# Patient Record
Sex: MALE | Race: WHITE | NOT HISPANIC OR LATINO | Employment: FULL TIME | URBAN - METROPOLITAN AREA
[De-identification: names, ages, dates, MRNs, and addresses within clinical notes are randomized per-mention and may not be internally consistent; named-entity substitution may affect disease eponyms.]

---

## 2018-05-31 ENCOUNTER — TRANSCRIBE ORDERS (OUTPATIENT)
Dept: ADMINISTRATIVE | Facility: HOSPITAL | Age: 53
End: 2018-05-31

## 2018-05-31 DIAGNOSIS — R20.0 BILATERAL HAND NUMBNESS: ICD-10-CM

## 2018-05-31 DIAGNOSIS — R20.0 NUMBNESS: Primary | ICD-10-CM

## 2018-06-13 ENCOUNTER — PROCEDURE VISIT (OUTPATIENT)
Dept: NEUROLOGY | Facility: CLINIC | Age: 53
End: 2018-06-13
Payer: COMMERCIAL

## 2018-06-13 DIAGNOSIS — R20.0 NUMBNESS: ICD-10-CM

## 2018-06-13 DIAGNOSIS — R20.0 BILATERAL HAND NUMBNESS: ICD-10-CM

## 2018-06-13 PROCEDURE — 95910 NRV CNDJ TEST 7-8 STUDIES: CPT | Performed by: PHYSICAL MEDICINE & REHABILITATION

## 2018-06-13 PROCEDURE — 95886 MUSC TEST DONE W/N TEST COMP: CPT | Performed by: PHYSICAL MEDICINE & REHABILITATION

## 2018-06-13 NOTE — PROGRESS NOTES
The procedure was discussed with the patient  Verbal consent was obtained after discussing risks and benefits  A sterile concentric needle electrode was used  The patient tolerated the procedure well  There were no complications  EMG REPORT    Test: Bilateral Upper Extremities   Performing Dr:  JOSE Giordano  The left median motor terminal latency was prolonged with normal compound motor action potential amplitude and a normal conduction velocity across the wrist   The right median motor terminal latency was prolonged with a normal compound motor action potential amplitude and a normal conduction velocity across the wrist   The bilateral ulnar motor conduction velocities and compound muscle action potentials were normal with normal distal latencies across the wrists  The left and right median sensory peak latency was prolonged with a low sensory action potential amplitude  The bilateral ulnar sensory conduction velocity and sensory action potentials were also normal with normal distal latencies across the wrists  The right median palmar evoked response was prolonged by 1 4 milliseconds as compared to the right ulnar palmar evoked response at the same distance  The left median palmar evoked response was prolonged by 2 0 milliseconds as compared to the left ulnar palmar evoked response at the same distance  The left and right ulnar and right median F wave latencies were within normal limits  The left median F-wave was prolonged  Concentric needle EMG of the upper extremities bilaterally and cervical paraspinal muscles revealed no spontaneous activities  Mild decreased recruitment was noted in the bilateral abductor pollicis brevis  Early recruited motor units appear normal with recruitment patterns being full or full for effort in the remaining muscles tested  INTERPRETATION: There is electrophysiologic evidence of a:    1    Bilateral moderate median nerve compression neuropathy at the wrist with demyelinative changes, consistent with a diagnosis of carpal tunnel syndrome  2   There is no evidence of a cervical radiculopathy or ulnar neuropathy bilaterally  Clinical correlation is recommended     JOSE Pedraza

## 2018-08-20 ENCOUNTER — TELEPHONE (OUTPATIENT)
Dept: NEUROLOGY | Facility: CLINIC | Age: 53
End: 2018-08-20

## 2018-08-20 NOTE — TELEPHONE ENCOUNTER
Dr Nazanin Chicas, patient called and asked for results of his EMG  The patient will see his Dr Ken Murphy morning at 9, and need results before then   Please let us know if you discussed his results with him, and if we can print them and give to patient  Please advised!!! Thank you

## 2021-06-22 ENCOUNTER — TELEPHONE (OUTPATIENT)
Dept: NEUROLOGY | Facility: CLINIC | Age: 56
End: 2021-06-22

## 2021-06-22 NOTE — TELEPHONE ENCOUNTER
Received a call from patient's PCP requesting a fax of the EMG from 2018  Faxed to [phone] as requested

## 2021-10-18 ENCOUNTER — TELEPHONE (OUTPATIENT)
Dept: NEUROLOGY | Facility: CLINIC | Age: 56
End: 2021-10-18

## 2021-10-20 ENCOUNTER — PROCEDURE VISIT (OUTPATIENT)
Dept: NEUROLOGY | Facility: CLINIC | Age: 56
End: 2021-10-20
Payer: OTHER MISCELLANEOUS

## 2021-10-20 DIAGNOSIS — G56.23 LESION OF ULNAR NERVE, BILATERAL UPPER LIMBS: ICD-10-CM

## 2021-10-20 PROCEDURE — 95911 NRV CNDJ TEST 9-10 STUDIES: CPT | Performed by: PHYSICAL MEDICINE & REHABILITATION

## 2021-10-20 PROCEDURE — 95886 MUSC TEST DONE W/N TEST COMP: CPT | Performed by: PHYSICAL MEDICINE & REHABILITATION

## 2023-05-01 RX ORDER — SILDENAFIL 100 MG/1
100 TABLET, FILM COATED ORAL
COMMUNITY
Start: 2019-06-18

## 2023-05-01 RX ORDER — ATORVASTATIN CALCIUM 20 MG/1
TABLET, FILM COATED ORAL
COMMUNITY
Start: 2021-06-04

## 2023-05-02 ENCOUNTER — PREP FOR PROCEDURE (OUTPATIENT)
Dept: GASTROENTEROLOGY | Facility: CLINIC | Age: 58
End: 2023-05-02

## 2023-05-02 ENCOUNTER — OFFICE VISIT (OUTPATIENT)
Dept: GASTROENTEROLOGY | Facility: CLINIC | Age: 58
End: 2023-05-02

## 2023-05-02 VITALS
WEIGHT: 260.4 LBS | HEART RATE: 80 BPM | HEIGHT: 68 IN | DIASTOLIC BLOOD PRESSURE: 84 MMHG | BODY MASS INDEX: 39.47 KG/M2 | SYSTOLIC BLOOD PRESSURE: 126 MMHG

## 2023-05-02 DIAGNOSIS — R12 HEARTBURN: Primary | ICD-10-CM

## 2023-05-02 DIAGNOSIS — K21.9 CHRONIC GERD: ICD-10-CM

## 2023-05-02 RX ORDER — PANTOPRAZOLE SODIUM 40 MG/1
40 TABLET, DELAYED RELEASE ORAL DAILY
Qty: 30 TABLET | Refills: 2 | Status: SHIPPED | OUTPATIENT
Start: 2023-05-02

## 2023-05-02 NOTE — PROGRESS NOTES
[facility] - Outpatient Consultation  [facility] 66 y o  male MRN: [id number]  Encounter: [id number]          ASSESSMENT AND PLAN:      1  Heartburn  2  Chronic GERD  - He has chronic GERD presenting with worsening heartburn recently and no improvement with OTC nexium  - We will schedule an EGD for further evaluation, screen for Love's  - Discussed anti-reflux diet, specifically cutting down on soda intake and weight loss  - Start protonix 40 mg daily      ______________________________________________________________________    HPI:  Adilia Whalen is a 67 yo M with a PMH of HLD, GERD, presenting for worsening and frequent heartburn recently  He reports having GERD symptoms for years and he lost weight previously which helped but then he gained weight and his symptoms worsened  He reports no improvement with OTC nexium  He has never had an EGD  He denies any associated nausea, vomiting, or dysphagia  He has no smoking history  He drinks 1-2 mixed drinks 2-3 times per week  He does drink soda daily as his source of caffeine in the morning, does not drink coffee  He is up to date with his colonoscopy with Dr Janet Joy  REVIEW OF SYSTEMS:    CONSTITUTIONAL: Denies any fever, chills, rigors, and weight loss  HEENT: No earache or tinnitus  Denies hearing loss or visual disturbances  CARDIOVASCULAR: No chest pain or palpitations  RESPIRATORY: Denies any cough, hemoptysis, shortness of breath or dyspnea on exertion  GASTROINTESTINAL: As noted in the History of Present Illness  GENITOURINARY: No problems with urination  Denies any hematuria or dysuria  NEUROLOGIC: No dizziness or vertigo, denies headaches  MUSCULOSKELETAL: Denies any muscle or joint pain  SKIN: Denies skin rashes or itching  ENDOCRINE: Denies excessive thirst  Denies intolerance to heat or cold  PSYCHOSOCIAL: Denies depression or anxiety  Denies any recent memory loss         Historical Information   History "reviewed  No pertinent past medical history  History reviewed  No pertinent surgical history  Social History   Social History     Substance and Sexual Activity   Alcohol Use Yes     Social History     Substance and Sexual Activity   Drug Use Never     Social History     Tobacco Use   Smoking Status Never   Smokeless Tobacco Never     Family History   Problem Relation Age of Onset    No Known Problems Mother     No Known Problems Father     No Known Problems Sister     No Known Problems Brother     No Known Problems Maternal Grandmother     No Known Problems Maternal Grandfather     No Known Problems Paternal Grandmother     No Known Problems Paternal Grandfather     No Known Problems Maternal Aunt     No Known Problems Maternal Uncle     No Known Problems Paternal Aunt     No Known Problems Paternal Uncle     No Known Problems Cousin        Meds/Allergies       Current Outpatient Medications:     atorvastatin (LIPITOR) 20 mg tablet    pantoprazole (PROTONIX) 40 mg tablet    sildenafil (VIAGRA) 100 mg tablet    No Known Allergies        Objective     Blood pressure 126/84, pulse 80, height 5' 8\" (1 727 m), weight 118 kg (260 lb 6 4 oz)  Body mass index is 39 59 kg/m²  PHYSICAL EXAM:      General Appearance:   Alert, cooperative, no distress   HEENT:   Normocephalic, atraumatic, anicteric      Neck:  Supple, symmetrical, trachea midline   Lungs:   Clear to auscultation bilaterally; no rales, rhonchi or wheezing; respirations unlabored    Heart[de-identified]   Regular rate and rhythm; no murmur, rub, or gallop  Abdomen:   Soft, non-tender, non-distended; normal bowel sounds; no masses, no organomegaly    Genitalia:   Deferred    Rectal:   Deferred    Extremities:  No cyanosis, clubbing or edema    Pulses:  2+ and symmetric    Skin:  No jaundice, rashes, or lesions    Lymph nodes:  No palpable cervical lymphadenopathy        Lab Results:   No visits with results within 1 Day(s) from this visit     Latest " known visit with results is:   No results found for any previous visit  Radiology Results:   No results found

## 2023-06-02 ENCOUNTER — HOSPITAL ENCOUNTER (OUTPATIENT)
Dept: GASTROENTEROLOGY | Facility: HOSPITAL | Age: 58
Setting detail: OUTPATIENT SURGERY
End: 2023-06-02
Attending: INTERNAL MEDICINE
Payer: COMMERCIAL

## 2023-06-02 ENCOUNTER — ANESTHESIA (OUTPATIENT)
Dept: GASTROENTEROLOGY | Facility: HOSPITAL | Age: 58
End: 2023-06-02

## 2023-06-02 ENCOUNTER — ANESTHESIA EVENT (OUTPATIENT)
Dept: GASTROENTEROLOGY | Facility: HOSPITAL | Age: 58
End: 2023-06-02

## 2023-06-02 VITALS
RESPIRATION RATE: 20 BRPM | BODY MASS INDEX: 40.03 KG/M2 | HEIGHT: 68 IN | SYSTOLIC BLOOD PRESSURE: 126 MMHG | WEIGHT: 264.11 LBS | HEART RATE: 70 BPM | TEMPERATURE: 97.4 F | DIASTOLIC BLOOD PRESSURE: 80 MMHG | OXYGEN SATURATION: 95 %

## 2023-06-02 DIAGNOSIS — R12 HEARTBURN: ICD-10-CM

## 2023-06-02 DIAGNOSIS — K21.9 CHRONIC GERD: ICD-10-CM

## 2023-06-02 PROBLEM — E78.5 HYPERLIPIDEMIA: Status: ACTIVE | Noted: 2018-05-31

## 2023-06-02 PROBLEM — E66.9 OBESITY: Status: ACTIVE | Noted: 2018-05-31

## 2023-06-02 PROBLEM — I10 HYPERTENSION, ESSENTIAL: Status: ACTIVE | Noted: 2019-06-13

## 2023-06-02 PROBLEM — G56.23 LESION OF ULNAR NERVE, BILATERAL: Status: ACTIVE | Noted: 2021-06-22

## 2023-06-02 PROBLEM — D75.1 POLYCYTHEMIA: Status: ACTIVE | Noted: 2022-06-23

## 2023-06-02 PROCEDURE — 88305 TISSUE EXAM BY PATHOLOGIST: CPT | Performed by: PATHOLOGY

## 2023-06-02 RX ORDER — SODIUM CHLORIDE, SODIUM LACTATE, POTASSIUM CHLORIDE, CALCIUM CHLORIDE 600; 310; 30; 20 MG/100ML; MG/100ML; MG/100ML; MG/100ML
INJECTION, SOLUTION INTRAVENOUS CONTINUOUS PRN
Status: DISCONTINUED | OUTPATIENT
Start: 2023-06-02 | End: 2023-06-02

## 2023-06-02 RX ORDER — PROPOFOL 10 MG/ML
INJECTION, EMULSION INTRAVENOUS AS NEEDED
Status: DISCONTINUED | OUTPATIENT
Start: 2023-06-02 | End: 2023-06-02

## 2023-06-02 RX ORDER — LIDOCAINE HYDROCHLORIDE 20 MG/ML
INJECTION, SOLUTION EPIDURAL; INFILTRATION; INTRACAUDAL; PERINEURAL AS NEEDED
Status: DISCONTINUED | OUTPATIENT
Start: 2023-06-02 | End: 2023-06-02

## 2023-06-02 RX ADMIN — PROPOFOL 50 MG: 10 INJECTION, EMULSION INTRAVENOUS at 10:04

## 2023-06-02 RX ADMIN — SODIUM CHLORIDE, SODIUM LACTATE, POTASSIUM CHLORIDE, AND CALCIUM CHLORIDE: .6; .31; .03; .02 INJECTION, SOLUTION INTRAVENOUS at 09:42

## 2023-06-02 RX ADMIN — PROPOFOL 50 MG: 10 INJECTION, EMULSION INTRAVENOUS at 10:02

## 2023-06-02 RX ADMIN — PROPOFOL 50 MG: 10 INJECTION, EMULSION INTRAVENOUS at 10:03

## 2023-06-02 RX ADMIN — LIDOCAINE HYDROCHLORIDE 5 ML: 20 INJECTION, SOLUTION EPIDURAL; INFILTRATION; INTRACAUDAL; PERINEURAL at 10:01

## 2023-06-02 RX ADMIN — PROPOFOL 200 MG: 10 INJECTION, EMULSION INTRAVENOUS at 10:01

## 2023-06-02 NOTE — ANESTHESIA POSTPROCEDURE EVALUATION
Post-Op Assessment Note    CV Status:  Stable    Pain management: adequate     Mental Status:  Sleepy   Hydration Status:  Euvolemic   PONV Controlled:  Controlled   Airway Patency:  Patent      Post Op Vitals Reviewed: Yes      Staff: CRNA         No notable events documented      /81 (06/02/23 1008)    Temp (!) 97 4 °F (36 3 °C) (06/02/23 1008)    Pulse 75 (06/02/23 1008)   Resp 18 (06/02/23 1008)    SpO2 92 % (06/02/23 1008)

## 2023-06-02 NOTE — H&P
"History and Physical -  [facility]  Buddy Ortiz 66 y o  male MRN: [id number]                  HPI: Buddy Ortiz is a 66y o  year old male who presents for endoscopy for evaluation of GERD      REVIEW OF SYSTEMS: Per the HPI, and otherwise unremarkable  Historical Information   No past medical history on file  No past surgical history on file  Social History   Social History     Substance and Sexual Activity   Alcohol Use Yes     Social History     Substance and Sexual Activity   Drug Use Never     Social History     Tobacco Use   Smoking Status Never   Smokeless Tobacco Never     Family History   Problem Relation Age of Onset   • No Known Problems Mother    • No Known Problems Father    • No Known Problems Sister    • No Known Problems Brother    • No Known Problems Maternal Grandmother    • No Known Problems Maternal Grandfather    • No Known Problems Paternal Grandmother    • No Known Problems Paternal Grandfather    • No Known Problems Maternal Aunt    • No Known Problems Maternal Uncle    • No Known Problems Paternal Aunt    • No Known Problems Paternal Uncle    • No Known Problems Cousin        Meds/Allergies     (Not in a hospital admission)      No Known Allergies    Objective     Blood pressure 151/92, pulse 76, temperature 97 6 °F (36 4 °C), temperature source Temporal, resp  rate 12, height 5' 8\" (1 727 m), weight 120 kg (264 lb 1 8 oz), SpO2 97 %  PHYSICAL EXAM    /92   Pulse 76   Temp 97 6 °F (36 4 °C) (Temporal)   Resp 12   Ht 5' 8\" (1 727 m)   Wt 120 kg (264 lb 1 8 oz)   SpO2 97%   BMI 40 16 kg/m²       Gen: NAD  CV: RRR  CHEST: Clear  ABD: soft, NT/ND  EXT: no edema      ASSESSMENT/PLAN:  This is a 66y o  year old male here for endoscopy, and he is stable and optimized for his procedure        "

## 2023-06-02 NOTE — ANESTHESIA PREPROCEDURE EVALUATION
Procedure:  EGD    Relevant Problems   CARDIO   (+) Hyperlipidemia   (+) Hypertension, essential      GI/HEPATIC   (+) Gastroesophageal reflux disease without esophagitis      No red flag cardiopulm sx  Reports negative ischemic eval prior to orthopaedic surgery in 2022  Physical Exam    Airway    Mallampati score: III  TM Distance: >3 FB  Neck ROM: full     Dental       Cardiovascular  Cardiovascular exam normal    Pulmonary  Pulmonary exam normal     Other Findings        Anesthesia Plan  ASA Score- 2     Anesthesia Type- IV sedation with anesthesia with ASA Monitors  Additional Monitors:   Airway Plan:           Plan Factors-Exercise tolerance (METS): >4 METS  Chart reviewed  EKG reviewed  Imaging results reviewed  Existing labs reviewed  Patient summary reviewed  Patient is not a current smoker  Induction- intravenous  Postoperative Plan-     Informed Consent- Anesthetic plan and risks discussed with patient  I personally reviewed this patient with the CRNA  Discussed and agreed on the Anesthesia Plan with the CRNA  Andres Wells

## 2023-06-07 PROCEDURE — 88305 TISSUE EXAM BY PATHOLOGIST: CPT | Performed by: PATHOLOGY

## 2023-07-29 DIAGNOSIS — R12 HEARTBURN: ICD-10-CM

## 2023-07-29 DIAGNOSIS — K21.9 CHRONIC GERD: ICD-10-CM

## 2023-07-31 RX ORDER — PANTOPRAZOLE SODIUM 40 MG/1
40 TABLET, DELAYED RELEASE ORAL DAILY
Qty: 90 TABLET | Refills: 0 | Status: SHIPPED | OUTPATIENT
Start: 2023-07-31